# Patient Record
Sex: FEMALE | Race: WHITE
[De-identification: names, ages, dates, MRNs, and addresses within clinical notes are randomized per-mention and may not be internally consistent; named-entity substitution may affect disease eponyms.]

---

## 2017-03-03 ENCOUNTER — HOSPITAL ENCOUNTER (EMERGENCY)
Dept: HOSPITAL 17 - PHED | Age: 67
Discharge: HOME | End: 2017-03-03
Payer: COMMERCIAL

## 2017-03-03 VITALS
RESPIRATION RATE: 16 BRPM | DIASTOLIC BLOOD PRESSURE: 76 MMHG | OXYGEN SATURATION: 97 % | SYSTOLIC BLOOD PRESSURE: 112 MMHG | HEART RATE: 93 BPM | TEMPERATURE: 99.8 F

## 2017-03-03 VITALS — HEIGHT: 66 IN | WEIGHT: 130.07 LBS | BODY MASS INDEX: 20.9 KG/M2

## 2017-03-03 DIAGNOSIS — I10: ICD-10-CM

## 2017-03-03 DIAGNOSIS — J44.1: Primary | ICD-10-CM

## 2017-03-03 DIAGNOSIS — E07.9: ICD-10-CM

## 2017-03-03 DIAGNOSIS — Z86.79: ICD-10-CM

## 2017-03-03 DIAGNOSIS — Z86.19: ICD-10-CM

## 2017-03-03 DIAGNOSIS — Z87.09: ICD-10-CM

## 2017-03-03 DIAGNOSIS — Z86.59: ICD-10-CM

## 2017-03-03 DIAGNOSIS — R07.89: ICD-10-CM

## 2017-03-03 DIAGNOSIS — Z87.19: ICD-10-CM

## 2017-03-03 DIAGNOSIS — Z72.0: ICD-10-CM

## 2017-03-03 DIAGNOSIS — Z87.39: ICD-10-CM

## 2017-03-03 PROCEDURE — 99283 EMERGENCY DEPT VISIT LOW MDM: CPT

## 2017-03-03 NOTE — PD
HPI


Chief Complaint:  Respiratory Symptoms


Time Seen by Provider:  12:15


Travel History


International Travel<30 days:  No


Contact w/Intl Traveler<30days:  No


Traveled to known affect area:  No





History of Present Illness


HPI


The patient was seen and examined in the presence of the nurse.  Complains of 

cough and congestion and wheezing.  She's had some runny nose.  She has a 

sitter at smoker with history of COPD.  Duration 6 weeks.  Symptoms severity is 

moderate.  No alleviating factors.  No hemoptysis.  Has discomfort in her left 

low chest wall when she coughs or moves her torso





PFSH


Past Medical History


Hx Anticoagulant Therapy:  No


Depression:  Yes


Cancer:  No


Cardiovascular Problems:  Yes (LEAKY MITRAL VALVE)


COPD:  Yes


Diabetes:  No


Diminished Hearing:  No


Endocrine:  Yes


Gastrointestinal Disorders:  Yes (GERD)


GERD:  Yes


Genitourinary:  No


Hepatitis:  Yes (HEP C )


Hiatal Hernia:  Yes


Hypertension:  Yes


Immune Disorder:  No


Musculoskeletal:  Yes (ARTHRITIS OSTEO)


Neurologic:  No


Psychiatric:  Yes (DEPRESSION)


Reproductive:  No


Respiratory:  Yes (COPD)


Thyroid Disease:  Yes


Influenza Vaccination:  No





Past Surgical History


AICD:  No


Eye Surgery:  Yes (R CATARACT)


Joint Replacement:  Yes (bilat hips)


Pacemaker:  No


Other Surgery:  Yes





Social History


Alcohol Use:  Yes (occasional glass of wine)


Tobacco Use:  Yes


Substance Use:  No





Allergies-Medications


(Allergen,Severity, Reaction):  


Coded Allergies:  


     Penicillin (Verified  Allergy, Severe, Anaphylaxis, 3/3/17)


Reported Meds & Prescriptions





Reported Meds & Active Scripts


Active


Zithromax Z-Yovani (Azithromycin) 250 Mg Dspk 250 Mg PO AS DIRECTED


     500 MG (2 tabs) day 1, then 1 tab days 2-5.


Ipratropium Neb (Ipratropium Bromide) 0.5 Mg/2.5 Ml Amp 0.5 Mg NEB Q12HR NEB


Albuterol Neb (Albuterol Sulfate) 2.5 Mg/3 Ml Neb 2.5 Mg NEB Q4HR NEB PRN


Prednisone 20 Mg Tab 40 Mg PO DAILY


     Take 40 mg (2 tablets) daily for 5 days


Reported


Ventolin Hfa 18 GM Inh (Albuterol Sulfate) 90 Mcg/Act Aer 2 Puff INH BID PRN


Omeprazole 20 Mg Tab 20 Mg PO DAILY


Zoloft (Sertraline HCl) 100 Mg Tab 100 Mg PO BID


Clonazepam 0.5 Mg Tab 0.5 Mg PO HS


Amlodipine (Amlodipine Besylate) 5 Mg Tab 5 Mg PO BID


Atorvastatin (Atorvastatin Calcium) 40 Mg Tab 40 Mg PO HS


Seroquel XR (Quetiapine Fumarate) 150 Mg Tab 150 Mg PO DAILY


Levothyroxine (Levothyroxine Sodium) 75 Mcg Tab 75 Mcg PO DAILY








Review of Systems


General / Constitutional:  No: Fever


Eyes:  No: Visual changes


HENT:  Positive: Congestion,  No: Headaches


Cardiovascular:  Positive: Chest Pain or Discomfort


Respiratory:  Positive: Cough, Shortness of Breath, Wheezing


Gastrointestinal:  No: Abdominal Pain


Genitourinary:  No: Dysuria


Musculoskeletal:  No: Pain


Skin:  No Rash


Neurologic:  No: Weakness


Psychiatric:  No: Depression


Endocrine:  No: Polydipsia


Hematologic/Lymphatic:  No: Easy Bruising





Physical Exam


Narrative


GENERAL: Well-nourished, well-developed patient in no apparent distress.


SKIN: Warm and dry.


HEAD: Atraumatic. Normocephalic. 


EYES: Pupils equal and round. No scleral icterus. No injection or drainage. 


ENT: No nasal bleeding or discharge.  Mucous membranes pink and moist.


NECK: Trachea midline. No JVD. 


CARDIOVASCULAR: Regular rate and rhythm.  No murmur appreciated.


RESPIRATORY: No accessory muscle use.  Faint expiratory wheeze. Breath sounds 

equal bilaterally. 


GASTROINTESTINAL: Abdomen soft, non-tender, nondistended. Hepatic and splenic 

margins not palpable. 


MUSCULOSKELETAL: No obvious deformities. No clubbing.  No cyanosis.  No edema.  

Has left lower chest wall pain in the anterior axillary line.  No crepitus.  

Readily reproducible with palpation


NEUROLOGICAL: Awake and alert. No obvious cranial nerve deficits.  Motor 

grossly within normal limits. Normal speech.


PSYCHIATRIC: Appropriate mood and affect; insight and judgment normal.





Data


Data


Last Documented VS





Vital Signs








  Date Time  Temp Pulse Resp B/P Pulse Ox O2 Delivery O2 Flow Rate FiO2


 


3/3/17 11:59  94   94 Room Air  


 


3/3/17 11:48 99.8  16 112/76    











MDM


Medical Decision Making


Medical Screen Exam Complete:  Yes


Emergency Medical Condition:  Yes


Medical Record Reviewed:  Yes


Differential Diagnosis


COPD, bronchitis, URI


Narrative Course


I have reviewed the patient's electronic medical record.





Patient has excellent saturations.


Quit smoking most importantly.


I refilled her nebulizer liquid with albuterol and Atrovent.


Gave her 5 days of prednisone and a Z-Yovani


sHe clearly has chest wall discomfort when coughing or twisting.  This does not 

need cardiac evaluation





Diagnosis





 Primary Impression:  


 COPD with acute exacerbation





***Additional Instructions:


The patient was advised to follow up with their physician and return if they 

worsen.


Quit smoking


***Med/Other Pt SpecificInfo:  Prescription(s) given


Scripts


Azithromycin (Zithromax Z-Yovani)250 Mg Zeak315 Mg PO AS DIRECTED  #1 DSPK  Ref 0


   500 MG (2 tabs) day 1, then 1 tab days 2-5.


   Prov:Jong Diallo MD         3/3/17 


Ipratropium Neb 0.5 Mg/2.5 Ml Amp0.5 Mg NEB Q12HR NEB  #60 NEBULE  Ref 0


   Prov:Jong Diallo MD         3/3/17 


Albuterol Neb 2.5 Mg/3 Ml Neb2.5 Mg NEB Q4HR NEB PRN (SHORTNESS OF BREATH) #60 

NEBULE  Ref 0


   Prov:Jong Diallo MD         3/3/17 


Prednisone 20 Mg Tab40 Mg PO DAILY  #10 TAB  Ref 0


   Take 40 mg (2 tablets) daily for 5 days


   Prov:Jong Diallo MD         3/3/17


Disposition:  01 DISCHARGE HOME


Condition:  Stable








Jong Diallo MD Mar 3, 2017 12:44

## 2018-05-03 ENCOUNTER — HOSPITAL ENCOUNTER (OUTPATIENT)
Dept: HOSPITAL 17 - PHRSP | Age: 68
End: 2018-05-03
Attending: SPECIALIST
Payer: COMMERCIAL

## 2018-05-03 DIAGNOSIS — J44.9: Primary | ICD-10-CM

## 2018-05-03 PROCEDURE — 82805 BLOOD GASES W/O2 SATURATION: CPT

## 2018-05-03 PROCEDURE — 94726 PLETHYSMOGRAPHY LUNG VOLUMES: CPT

## 2018-05-03 PROCEDURE — 94060 EVALUATION OF WHEEZING: CPT

## 2018-05-03 PROCEDURE — 36600 WITHDRAWAL OF ARTERIAL BLOOD: CPT

## 2018-05-03 PROCEDURE — 94618 PULMONARY STRESS TESTING: CPT

## 2018-05-03 PROCEDURE — 94729 DIFFUSING CAPACITY: CPT

## 2018-05-04 NOTE — RSPPFT
DATE OF PROCEDURE:  5/3/18



COMMENTS:   



Spirometry shows FVC of 2.9 at 91% of predicted, FEV1 of 1.7 at 67%, FEV1/FVC ratio is 
decreased. Flow is decreased at FEF 25, FEF 50, FEF 75 and FEF 25-75. There is a mild 
response after bronchodilator treatment. Lung volumes show residual volume is increased. 
TLC is increased. Diffusion capacity is decreased. Flow volume loop indicates an 
obstructive pattern. Room air arterial blood gases show pH of 7.44, PCO2 of 38, PO2 of 89, 
BiCarb of 25 and Saturation at 92%. 



6-minute walk test shows no de-saturation.  



Compared to previous study of 2013, the FEV1 is further decreased from 91% to 67%. The 
diffusion capacity is also further decreased. 





IMPRESSION:    

   



1.    Moderately severe obstructive lung disease.

2.    Mild response after bronchodilator treatment.

3.    Lung volumes show hyperinflation.

4.    Diffusion capacity is decreased.

5.    Blood gases show normal oxygenation.

6.    6-minute walk test shows no de-saturation.